# Patient Record
Sex: MALE | Race: ASIAN | NOT HISPANIC OR LATINO | URBAN - METROPOLITAN AREA
[De-identification: names, ages, dates, MRNs, and addresses within clinical notes are randomized per-mention and may not be internally consistent; named-entity substitution may affect disease eponyms.]

---

## 2024-05-17 ENCOUNTER — MEDICAL CORRESPONDENCE (OUTPATIENT)
Dept: HEALTH INFORMATION MANAGEMENT | Facility: CLINIC | Age: 59
End: 2024-05-17
Payer: MEDICAID

## 2024-05-17 ENCOUNTER — TRANSFERRED RECORDS (OUTPATIENT)
Dept: HEALTH INFORMATION MANAGEMENT | Facility: CLINIC | Age: 59
End: 2024-05-17
Payer: MEDICAID

## 2024-05-23 ENCOUNTER — OFFICE VISIT (OUTPATIENT)
Dept: CARDIOLOGY | Facility: CLINIC | Age: 59
End: 2024-05-23
Payer: MEDICAID

## 2024-05-23 VITALS
OXYGEN SATURATION: 97 % | HEART RATE: 80 BPM | WEIGHT: 127 LBS | RESPIRATION RATE: 20 BRPM | DIASTOLIC BLOOD PRESSURE: 64 MMHG | SYSTOLIC BLOOD PRESSURE: 134 MMHG

## 2024-05-23 DIAGNOSIS — R94.31 ABNORMAL ELECTROCARDIOGRAM: ICD-10-CM

## 2024-05-23 DIAGNOSIS — Z01.810 PRE-OPERATIVE CARDIOVASCULAR EXAMINATION: Primary | ICD-10-CM

## 2024-05-23 DIAGNOSIS — N18.31 STAGE 3A CHRONIC KIDNEY DISEASE (H): ICD-10-CM

## 2024-05-23 DIAGNOSIS — D12.6 TUBULOVILLOUS ADENOMA OF COLON: ICD-10-CM

## 2024-05-23 PROCEDURE — 99244 OFF/OP CNSLTJ NEW/EST MOD 40: CPT | Performed by: INTERNAL MEDICINE

## 2024-05-23 RX ORDER — ALLOPURINOL 100 MG/1
100 TABLET ORAL DAILY
COMMUNITY
Start: 2024-05-17

## 2024-05-23 RX ORDER — POLYETHYLENE GLYCOL 3350 17 G/17G
17 POWDER, FOR SOLUTION ORAL DAILY
COMMUNITY
Start: 2024-03-26

## 2024-05-23 RX ORDER — LOSARTAN POTASSIUM AND HYDROCHLOROTHIAZIDE 12.5; 5 MG/1; MG/1
1 TABLET ORAL DAILY
COMMUNITY

## 2024-05-23 RX ORDER — LEVOTHYROXINE SODIUM 50 UG/1
50 TABLET ORAL DAILY
COMMUNITY

## 2024-05-23 RX ORDER — ACETAMINOPHEN 500 MG
500 TABLET ORAL EVERY 6 HOURS PRN
COMMUNITY
Start: 2024-04-13

## 2024-05-23 RX ORDER — PANTOPRAZOLE SODIUM 40 MG/1
40 TABLET, DELAYED RELEASE ORAL DAILY
COMMUNITY
Start: 2024-04-18

## 2024-05-23 NOTE — PATIENT INSTRUCTIONS
It was a pleasure to meet with you today.      Below is a summary of your visit.   Schedule an echocardiogram to look at heart function prior to your surgery.  It is very likely that you will be able to go ahead with surgery as planned.  Follow up as needed pending echo results.    We will call you to inform you of your test or procedure results within 3 business days of the test being performed.  If you do not hear from our office with the test results within 1 week please do not hesitate to call asking for these results.     Please do not hesitate to call the [facility] with any questions or concerns at [phone]. You can also reach my nurse, Sharon, at [phone].    Sincerely,

## 2024-05-23 NOTE — PROGRESS NOTES
[facility] HEART CARE   [address]  [place] [address]   [email or web address]   OFFICE: [phone]     CARDIOLOGY CLINIC NOTE     Thank you, Shonda Trivedi, for asking the [facility] Heart Care team to see Mr. Jen Isbell to evaluate New Patient      Assessment/Recommendations   Assessment:    Abnormal ECG - with suggestion of prior inferior infarct. No recent or prior symptoms of a previous MI.  Tubulovillous adenoma of colon - complicated by a major GI bleed, requiring resection, scheduled for 6/10/24  CKD stage IIIa    Plan:  Recommend TTE prior to surgery. As long as there are no major abnormal findings, with his lack of cardiac symptoms and exertional tolerance of >>4METs Mr. Isbell would be able to proceed with his surgery as scheduled.  Can follow up with me as needed pending echo results.    ADDENDUM:  Echocardiogram demonstrates normal LV ejection fraction with moderate aortic valve regurgitation.  The aortic regurgitation is not hemodynamically significant and is not likely to cause any symptoms.  This also does not affect surgical risk.  Okay to proceed with the planned surgery without additional cardiac evaluation or change in management.  Recommend follow-up with me in a year with an echocardiogram prior.  6/7/2024 4:53 PM         History of Present Illness   Mr. Jen Isbell is a 59 year old male with a significant past history of GI bleed and CKD who presents for evaluation of an abnormal ECG.    Mr. Isbell recently had a major GI bleed due to a colonic mass. He is scheduled for surgical resection of this mass on 6/10. During his preoperative evaluation he was noted to have an abnormal ECG suggestive of a prior inferior MI. He denies any current or recent chest pain symptoms. He walks a mile several days per week without any exertional cardiorespiratory symptoms.    Other than noted above, Mr. Isbell denies any chest pain/pressure/tightness, shortness of breath  at rest or with exertion, light headedness/dizziness, pre-syncope, syncope, lower extremity swelling, palpitations, paroxysmal nocturnal dyspnea (PND), or orthopnea.     Cardiac Problems and Cardiac Diagnostics     Most Recent Cardiac testing:  ECG dated 5/17/24 (personaly reviewed and interpreted): sinus rhythm with inferior q waves and nonspecific ST changes.         Medications  Allergies   Current Outpatient Medications   Medication Sig Dispense Refill    acetaminophen (TYLENOL) 500 MG tablet Take 500 mg by mouth every 6 hours as needed for mild pain      allopurinol (ZYLOPRIM) 100 MG tablet Take 100 mg by mouth daily      Cholecalciferol (VITAMIN D3) 50 MCG CAPS Take 50 mcg by mouth daily      pantoprazole (PROTONIX) 40 MG EC tablet Take 40 mg by mouth daily      levothyroxine (SYNTHROID/LEVOTHROID) 50 MCG tablet Take 50 mcg by mouth daily (Patient not taking: Reported on 5/23/2024)      losartan-hydrochlorothiazide (HYZAAR) 50-12.5 MG tablet Take 1 tablet by mouth daily (Patient not taking: Reported on 5/23/2024)      polyethylene glycol (MIRALAX) 17 GM/Dose powder Take 17 g by mouth daily (Patient not taking: Reported on 5/23/2024)        Allergies   Allergen Reactions    Naproxen GI Disturbance    Nsaids GI Disturbance        Physical Examination Review of Systems   Vitals: /64 (BP Location: Left arm, Patient Position: Sitting, Cuff Size: Adult Regular)   Pulse 80   Resp 20   Wt 57.6 kg (127 lb)   SpO2 97%   BMI= There is no height or weight on file to calculate BMI.  Wt Readings from Last 3 Encounters:   05/23/24 57.6 kg (127 lb)       General: pleasant male. No acute distress.   Neck: No JVD  Lungs: clear to auscultation  COR: normal rate, regular rhythm, No murmurs, rubs, or gallops  Extrem: No edema        Please refer above for cardiac ROS details.       Past History     Family History: family history is unknown    Social History:   Social History     Socioeconomic History    Marital status:  "     Spouse name: Not on file    Number of children: Not on file    Years of education: Not on file    Highest education level: Not on file   Occupational History    Not on file   Tobacco Use    Smoking status: Never     Passive exposure: Never    Smokeless tobacco: Never   Substance and Sexual Activity    Alcohol use: Not on file    Drug use: Not on file    Sexual activity: Not on file   Other Topics Concern    Not on file   Social History Narrative    Not on file     Social Determinants of Health     Financial Resource Strain: Not on file   Food Insecurity: Not on file   Transportation Needs: Not on file   Physical Activity: Not on file   Stress: Not on file   Social Connections: Not on file   Interpersonal Safety: Not At Risk (4/16/2024)    Received from [facility]     Humiliation, Afraid, Rape, and Kick questionnaire     Fear of Current or Ex-Partner: No     Emotionally Abused: No     Physically Abused: No     Sexually Abused: No   Housing Stability: Not on file            Lab Results    Chemistry/lipid CBC Cardiac Enzymes/BNP/TSH/INR   No results found for: \"CHOL\", \"HDL\", \"TRIG\", \"CHOLHDL\", \"CREATININE\", \"BUN\", \"NA\", \"CO2\" No results found for: \"WBC\", \"HGB\", \"HCT\", \"MCV\", \"PLT\" No results found for: \"CKTOTAL\", \"CKMB\", \"TROPONINI\", \"BNP\", \"TSH\", \"INR\"         " NEGATIVE

## 2024-05-23 NOTE — LETTER
5/23/2024    FRANCIS HICKEY MD  [facility] [address]  [facility] [address]    RE: Jen Isbell       Dear Colleague,     I had the pleasure of seeing Jen Isbell in the [facility].    [facility] HEART CARE   [address]  [place] [address]   [email or web address]   OFFICE: [phone]     CARDIOLOGY CLINIC NOTE     Thank you, Francis Trivedi, for asking the [facility] Heart Care team to see Mr. Jen Isbell to evaluate New Patient      Assessment/Recommendations   Assessment:    Abnormal ECG - with suggestion of prior inferior infarct. No recent or prior symptoms of a previous MI.  Tubulovillous adenoma of colon - complicated by a major GI bleed, requiring resection, scheduled for 6/10/24  CKD stage IIIa    Plan:  Recommend TTE prior to surgery. As long as there are no major abnormal findings, with his lack of cardiac symptoms and exertional tolerance of >>4METs Mr. Isbell would be able to proceed with his surgery as scheduled.  Can follow up with me as needed pending echo results.         History of Present Illness   Mr. Jen Isbell is a 59 year old male with a significant past history of GI bleed and CKD who presents for evaluation of an abnormal ECG.    Mr. Isbell recently had a major GI bleed due to a colonic mass. He is scheduled for surgical resection of this mass on 6/10. During his preoperative evaluation he was noted to have an abnormal ECG suggestive of a prior inferior MI. He denies any current or recent chest pain symptoms. He walks a mile several days per week without any exertional cardiorespiratory symptoms.    Other than noted above, Mr. Isbell denies any chest pain/pressure/tightness, shortness of breath at rest or with exertion, light headedness/dizziness, pre-syncope, syncope, lower extremity swelling, palpitations, paroxysmal nocturnal dyspnea (PND), or orthopnea.     Cardiac Problems and Cardiac Diagnostics     Most Recent Cardiac  testing:  ECG dated 5/17/24 (personaly reviewed and interpreted): sinus rhythm with inferior q waves and nonspecific ST changes.         Medications  Allergies   Current Outpatient Medications   Medication Sig Dispense Refill    acetaminophen (TYLENOL) 500 MG tablet Take 500 mg by mouth every 6 hours as needed for mild pain      allopurinol (ZYLOPRIM) 100 MG tablet Take 100 mg by mouth daily      Cholecalciferol (VITAMIN D3) 50 MCG CAPS Take 50 mcg by mouth daily      pantoprazole (PROTONIX) 40 MG EC tablet Take 40 mg by mouth daily      levothyroxine (SYNTHROID/LEVOTHROID) 50 MCG tablet Take 50 mcg by mouth daily (Patient not taking: Reported on 5/23/2024)      losartan-hydrochlorothiazide (HYZAAR) 50-12.5 MG tablet Take 1 tablet by mouth daily (Patient not taking: Reported on 5/23/2024)      polyethylene glycol (MIRALAX) 17 GM/Dose powder Take 17 g by mouth daily (Patient not taking: Reported on 5/23/2024)        Allergies   Allergen Reactions    Naproxen GI Disturbance    Nsaids GI Disturbance        Physical Examination Review of Systems   Vitals: /64 (BP Location: Left arm, Patient Position: Sitting, Cuff Size: Adult Regular)   Pulse 80   Resp 20   Wt 57.6 kg (127 lb)   SpO2 97%   BMI= There is no height or weight on file to calculate BMI.  Wt Readings from Last 3 Encounters:   05/23/24 57.6 kg (127 lb)       General: pleasant male. No acute distress.   Neck: No JVD  Lungs: clear to auscultation  COR: normal rate, regular rhythm, No murmurs, rubs, or gallops  Extrem: No edema        Please refer above for cardiac ROS details.       Past History     Family History: family history is unknown    Social History:   Social History     Socioeconomic History    Marital status:      Spouse name: Not on file    Number of children: Not on file    Years of education: Not on file    Highest education level: Not on file   Occupational History    Not on file   Tobacco Use    Smoking status: Never     Passive  "exposure: Never    Smokeless tobacco: Never   Substance and Sexual Activity    Alcohol use: Not on file    Drug use: Not on file    Sexual activity: Not on file   Other Topics Concern    Not on file   Social History Narrative    Not on file     Social Determinants of Health     Financial Resource Strain: Not on file   Food Insecurity: Not on file   Transportation Needs: Not on file   Physical Activity: Not on file   Stress: Not on file   Social Connections: Not on file   Interpersonal Safety: Not At Risk (4/16/2024)    Received from [facility]     Humiliation, Afraid, Rape, and Kick questionnaire     Fear of Current or Ex-Partner: No     Emotionally Abused: No     Physically Abused: No     Sexually Abused: No   Housing Stability: Not on file            Lab Results    Chemistry/lipid CBC Cardiac Enzymes/BNP/TSH/INR   No results found for: \"CHOL\", \"HDL\", \"TRIG\", \"CHOLHDL\", \"CREATININE\", \"BUN\", \"NA\", \"CO2\" No results found for: \"WBC\", \"HGB\", \"HCT\", \"MCV\", \"PLT\" No results found for: \"CKTOTAL\", \"CKMB\", \"TROPONINI\", \"BNP\", \"TSH\", \"INR\"         Thank you for allowing me to participate in the care of your patient.      Sincerely,     Adiel Carnes MD     [facility]  cc:   Shonda Blanco MD  [facility]  [address] [place] [address]  "

## 2024-06-05 ENCOUNTER — HOSPITAL ENCOUNTER (OUTPATIENT)
Dept: CARDIOLOGY | Facility: CLINIC | Age: 59
Discharge: HOME | End: 2024-06-05
Attending: INTERNAL MEDICINE | Admitting: INTERNAL MEDICINE
Payer: MEDICAID

## 2024-06-05 DIAGNOSIS — R94.31 ABNORMAL ELECTROCARDIOGRAM: ICD-10-CM

## 2024-06-05 DIAGNOSIS — Z01.810 PRE-OPERATIVE CARDIOVASCULAR EXAMINATION: ICD-10-CM

## 2024-06-05 LAB — LVEF ECHO: NORMAL

## 2024-06-05 PROCEDURE — 93356 MYOCRD STRAIN IMG SPCKL TRCK: CPT

## 2024-06-05 PROCEDURE — 93356 MYOCRD STRAIN IMG SPCKL TRCK: CPT | Performed by: INTERNAL MEDICINE

## 2024-06-05 PROCEDURE — 93306 TTE W/DOPPLER COMPLETE: CPT | Mod: 26 | Performed by: INTERNAL MEDICINE

## 2024-06-06 ENCOUNTER — APPOINTMENT (OUTPATIENT)
Dept: INTERPRETER SERVICES | Facility: CLINIC | Age: 59
End: 2024-06-06
Payer: MEDICAID

## 2024-06-07 ENCOUNTER — TELEPHONE (OUTPATIENT)
Dept: CARDIOLOGY | Facility: CLINIC | Age: 59
End: 2024-06-07
Payer: MEDICAID

## 2024-06-07 NOTE — TELEPHONE ENCOUNTER
LVM on [facility] pre-op line stating note from provider.  -finesse Carnes MD  6/5/2024  4:14 PM CDT       Normal LVEF. Moderate aortic regurgitation noted. Not likely hemodynamically significant or causing symptoms.  Okay to proceed with surgery on 6/10 as scheduled.  Follow up with me in a year with another echo prior for his aortic valve regurgitation.  JOSE ARMANDO

## 2024-06-07 NOTE — TELEPHONE ENCOUNTER
Health Call Center    Phone Message    May a detailed message be left on voicemail: no     Reason for Call: Other: Lindsey from [facility] called.  Pt needs to be cleared for surgery- waiting on ok/ results from echo for Dr. Carnes.  She stated they should be able to see in Care Everywhere, but pt has not been told and they are waiting to hear one way or another.       Please call with any questions or note in pt's chart, per Lindsey.  [phone]      Action Taken: Message routed to:  [facility] cardio    Travel Screening: Not Applicable     Date of Service:

## 2024-06-07 NOTE — TELEPHONE ENCOUNTER
Lindsey lu/ Navneet Ren is requesting statement regarding approval for surgery be added to 5/23/24 pre-op visit with Dr Carnes.    Note sent to provider requesting.  Lindsey updated that request was sent to provider, and that he is out of the office next week.    Request that note be addended once provider returns, or prior if checking messages.  -ral

## 2024-06-13 DIAGNOSIS — I35.1 AORTIC VALVE REGURGITATION: Primary | ICD-10-CM
